# Patient Record
Sex: MALE | URBAN - METROPOLITAN AREA
[De-identification: names, ages, dates, MRNs, and addresses within clinical notes are randomized per-mention and may not be internally consistent; named-entity substitution may affect disease eponyms.]

---

## 2018-05-08 ENCOUNTER — TELEPHONE (OUTPATIENT)
Dept: OTHER | Facility: CLINIC | Age: 53
End: 2018-05-08

## 2018-05-08 NOTE — TELEPHONE ENCOUNTER
5/8/2018    Call Regarding Onboarding are Choices    Attempt 1    Message on voicemail     Comments: spouse dep       Outreach   SV

## 2018-06-05 NOTE — TELEPHONE ENCOUNTER
6/5/2018    Call Regarding Onboarding [employer] Choices    Attempt 2    Message on voicemail     Comments: 1 spouse dep      Outreach   Karla Loomis

## 2018-07-31 NOTE — TELEPHONE ENCOUNTER
7/31/2018    Call Regarding Onboarding are Choices    Attempt 3    Message on voicemail     Comments:       Outreach   Karla Loomis